# Patient Record
(demographics unavailable — no encounter records)

---

## 2024-10-09 NOTE — DISCUSSION/SUMMARY
[de-identified] : 43yM pw persistent medial knee pain after MCL injury 6/27/24.  Has done extensive PT and good strength/ROM but given medial pain I would recommend repeat MRI to eval for both MCL healing and to eval for possible meniscal injury. The patient was extensively counseled on treatment options including but not limited to observation, rest/activity modification, bracing, anti-inflammatory medications, physical therapy, injections, and surgery.  The natural history of the disease was thoroughly explained.   To better diagnose their condition, I recommended MRI. The patient will proceed with: -MRI -brace -nsaid -pt was instructed on the importance of resting, icing and elevating to minimize swelling -RTC after MRI   I have personally obtained the history, reviewed the ROS as noted, and performed the physical examination today.  The patient and I discussed the assessment and options and developed the plan.  All questions were answered and the patient stated their understanding of the treatment plan and appreciation of the visit.   My cumulative time spent on this patient's visit included: Preparation for the visit, review of the medical records, review of pertinent diagnostic studies, examination and counseling of the patient on the above diagnosis, treatment plan and prognosis, orders of diagnostic tests, medications and/or appropriate procedures and documentation in the medical records of today's visit.   Jitendra Stuart MD

## 2024-10-09 NOTE — HISTORY OF PRESENT ILLNESS
[de-identified] : 43yM pw L knee pain. [FreeTextEntry1] : His knee was injured stepping down from an installation 6/27/24.  He felt a twisting injury to his knee and an MRI demonstrated an MCL sprain, an effusion, and PF chondral wear.  He has been doing PT at [facility] since then but feels sharp medial pain still when he plants or turns.  He has no n/p.  He has been using a brace. Works as .  Working at [employer] on [facility].

## 2024-10-09 NOTE — PHYSICAL EXAM
[de-identified] : Gen: NAD Resp: Nonlabored respirations, no SOB Gait: Nl   L Knee: Skin intact No effusion 0-130 AROM Tender MJL 5/5 IP Q EHL TA GS SILT L3-S1 2+ dp pt wwp bcr   1A lachman and (-) pivot shift Grade 1 posterior drawer Stable to v/v at 0 and 30 degrees (-) Dial test at 30, 90 degrees   (-) Corine, Thessaly Medial joint pain with shallow 2 leg squat   1+ patellar translation (-) pain with patellar compression/grind (-) J sign (-) apprehension  [de-identified] : I independently reviewed and interpreted the MRI of the knee.  I discussed with the patient the MRI demonstrates an MCL sprain, effusion, PF wear

## 2024-11-07 NOTE — DISCUSSION/SUMMARY
[de-identified] : 43yM pw persistent medial knee pain after MCL injury 6/27/24.  Has done extensive PT and good strength/ROM - medial pain now resolved. MRI does not show discrete meniscal tear. The patient was extensively counseled on treatment options including but not limited to observation, rest/activity modification, bracing, anti-inflammatory medications, physical therapy, injections, and surgery.  The natural history of the disease was thoroughly explained.   To better diagnose their condition, I recommended MRI. The patient will proceed with: -brace -nsaid -pt was instructed on the importance of resting, icing and elevating to minimize swelling -RTC 6w   I have personally obtained the history, reviewed the ROS as noted, and performed the physical examination today.  The patient and I discussed the assessment and options and developed the plan.  All questions were answered and the patient stated their understanding of the treatment plan and appreciation of the visit.   My cumulative time spent on this patient's visit included: Preparation for the visit, review of the medical records, review of pertinent diagnostic studies, examination and counseling of the patient on the above diagnosis, treatment plan and prognosis, orders of diagnostic tests, medications and/or appropriate procedures and documentation in the medical records of today's visit.   Jitendra Stuart MD

## 2024-11-07 NOTE — PHYSICAL EXAM
[de-identified] : Gen: NAD Resp: Nonlabored respirations, no SOB Gait: Nl   L Knee: Skin intact No effusion 0-130 AROM nontender 5/5 IP Q EHL TA GS SILT L3-S1 2+ dp pt wwp bcr   1A lachman and (-) pivot shift Grade 1 posterior drawer Stable to v/v at 0 and 30 degrees (-) Dial test at 30, 90 degrees   (-) Corine, Thessaly no joint pain with 2 leg squat   1+ patellar translation (-) pain with patellar compression/grind (-) J sign (-) apprehension  [de-identified] : I independently reviewed and interpreted the MRI of the knee.  I discussed with the patient the MRI demonstrates an MCL sprain, effusion, PF wear

## 2024-11-07 NOTE — RETURN TO WORK/SCHOOL
[FreeTextEntry1] : He was re-evaluated and may return to work on 11/14/24. [FreeTextEntry2] : Jitendra Stuart MD

## 2024-11-07 NOTE — HISTORY OF PRESENT ILLNESS
[de-identified] : 43yM pw L knee pain. [FreeTextEntry1] : His knee was injured stepping down from an installation 6/27/24.  He felt a twisting injury to his knee and an MRI demonstrated an MCL sprain, an effusion, and PF chondral wear.  He has been doing PT at [facility] since then but feels sharp medial pain still when he plants or turns.  He has no n/p.  He has been using a brace. Works as .  Working at [employer] on [facility].  11/6 - has continued PT, MRI complete, feels much stronger

## 2024-11-07 NOTE — PHYSICAL EXAM
[de-identified] : Gen: NAD Resp: Nonlabored respirations, no SOB Gait: Nl   L Knee: Skin intact No effusion 0-130 AROM nontender 5/5 IP Q EHL TA GS SILT L3-S1 2+ dp pt wwp bcr   1A lachman and (-) pivot shift Grade 1 posterior drawer Stable to v/v at 0 and 30 degrees (-) Dial test at 30, 90 degrees   (-) Corine, Thessaly no joint pain with 2 leg squat   1+ patellar translation (-) pain with patellar compression/grind (-) J sign (-) apprehension  [de-identified] : I independently reviewed and interpreted the MRI of the knee.  I discussed with the patient the MRI demonstrates an MCL sprain, effusion, PF wear

## 2024-11-07 NOTE — DISCUSSION/SUMMARY
[de-identified] : 43yM pw persistent medial knee pain after MCL injury 6/27/24.  Has done extensive PT and good strength/ROM - medial pain now resolved. MRI does not show discrete meniscal tear. The patient was extensively counseled on treatment options including but not limited to observation, rest/activity modification, bracing, anti-inflammatory medications, physical therapy, injections, and surgery.  The natural history of the disease was thoroughly explained.   To better diagnose their condition, I recommended MRI. The patient will proceed with: -brace -nsaid -pt was instructed on the importance of resting, icing and elevating to minimize swelling -RTC 6w   I have personally obtained the history, reviewed the ROS as noted, and performed the physical examination today.  The patient and I discussed the assessment and options and developed the plan.  All questions were answered and the patient stated their understanding of the treatment plan and appreciation of the visit.   My cumulative time spent on this patient's visit included: Preparation for the visit, review of the medical records, review of pertinent diagnostic studies, examination and counseling of the patient on the above diagnosis, treatment plan and prognosis, orders of diagnostic tests, medications and/or appropriate procedures and documentation in the medical records of today's visit.   Jitendra Stuart MD

## 2024-11-07 NOTE — HISTORY OF PRESENT ILLNESS
[de-identified] : 43yM pw L knee pain. [FreeTextEntry1] : His knee was injured stepping down from an installation 6/27/24.  He felt a twisting injury to his knee and an MRI demonstrated an MCL sprain, an effusion, and PF chondral wear.  He has been doing PT at [facility] since then but feels sharp medial pain still when he plants or turns.  He has no n/p.  He has been using a brace. Works as .  Working at [employer] on [facility].  11/6 - has continued PT, MRI complete, feels much stronger

## 2024-11-13 NOTE — PROCEDURE
[FreeTextEntry1] : MRI of (L) knee  7/22/2024 revealed a grade 1 partial tear of the MCL, large knee joint effusion, high-grade chondral defect in the trochlea 1.5x1.2cm.  MRI of (L) knee 10/23/2024 revealed interval healing of a grade 1 MCL sprain, moderate to large (L) knee joint effusion, unchanged. Partial-thickness cartilage loss in the patellofemoral compartment worst along lateral trochlear facet, unchanged.

## 2024-11-13 NOTE — PLAN
[FreeTextEntry1] : Cleared to return to work at full duty- note given f/u with ortho as needed, cortisone injection considered  Continue wearing knee brace as needed Will f/u with office if further PT is needed- [facility] @ [phone] Continue with ibuprofen as needed for swelling/pain  [FreeTextEntry2] : 11/14/2024 [FreeTextEntry3] : Good  [FreeTextEntry4] : 3 months

## 2024-11-13 NOTE — HISTORY OF PRESENT ILLNESS
[FreeTextEntry1] : 43 year old male presents to office s/p (L) leg injury while at work. He reports stepping off a ladder carrying a 10ft long pipe with assistance when he felt his left knee buckle as he stepped onto the ground. He immediately felt a sharp aching pain to his left knee. After the incident, he was able to commute home by himself. He reports pain when walking and knee stiffness. He has been applying cold compress to his knee since. He reports his left knee was visibly swollen at time of injury. He states needing to keep knee straight, as bending causes pain. He reports wearing a knee brace which helps alleviate swelling and pain, although once he removes brace, swelling and pain return. MRI of (L) knee revealed a grade 1 partial tear of the MCL, large knee joint effusion, high-grade chondral defect in the trochlea 1.5x1.2cm. He is currently not working. He has been going to PT and reports improvement, would like to continue. He is using a knee brace and applying cold compress as needed. He states he feels "clicking" with knee flexion/extension and occasionally feels weakness.  UPDATE 11/13/2024: Pt reports improvement in symptoms. He reports sharp aching pain, knee stiffness and swelling has improved. He has followed up with Dr. Stuart. Repeat MRI of (L) knee revealed no significant changes from previous MRI. He still c/o "clicking" sound with knee flexion/extension. He was given a knee sleeve brace to replace his prior knee brace (included side stabilizer) which he uses as needed. He has ~2 sessions of PT remaining and will follow up with office if further PT sessions are needed. He is ready to return to work.   [FreeTextEntry2] : [employer]  [FreeTextEntry3] : Pain is exacerbated when walking, bending, squatting, kneeling  [FreeTextEntry4] : None [FreeTextEntry5] : No [Has the patient missed work because of the injury/illness?] : The patient has not missed work because of the injury/illness. [No] : The patient is currently not working. [FreeTextEntry6] : 8/12/2024

## 2024-11-13 NOTE — PHYSICAL EXAM
[General Appearance - Alert] : alert [General Appearance - In No Acute Distress] : in no acute distress [Motor Tone] : muscle strength and tone were normal [FreeTextEntry1] : Crepitus palpable with knee flexion and extension. No tenderness to palpation along patella, MCL and ACL. Full ROM. Negative noble test.  [Skin Color & Pigmentation] : normal skin color and pigmentation [Motor Exam] : the motor exam was normal [Oriented To Time, Place, And Person] : oriented to person, place, and time [Impaired Insight] : insight and judgment were intact

## 2024-11-13 NOTE — ASSESSMENT
[Indicate if, in your opinion, the incident that the patient described was the competent medical cause of this injury/illness.] : The incident that the patient described was the competent medical cause of this injury/illness: Yes [Indicate if the patient's complaints are consistent with his/her history of the injury/illness.] : Indicate if the patient's complaints are consistent with his/her history of the injury/illness: Yes [Yes] : Yes, it is consistent [Can the patient return to usual work activities as indicated? If yes, indicate date___] : The patient can return to usual work activities on [unfilled] [Are there any work limitations? (If so, explain and quantify, including the anticipated duration of the limitations)] : There are no work limitations.  [FreeTextEntry1] : Sprain and strain of MCL (L) knee grade 1 partial tear of MCL [FreeTextEntry5] : 0

## 2025-01-26 NOTE — ASSESSMENT
[FreeTextEntry1] : The patient had a febrile illness with fatigue, sweats and aches and he was in contact with influenza.  The patient likely had influenza.  He is eight days into symptoms so Tamiflu isn't an option.  He now has a worse cough with colored phlegm and a secondary bronchitis is possible.  Will treat with a Z-gibran.  OTC antitussives discussed.  Call PRN.  There was a small amount of blood in the phlegm.  He was advised to monitor and if it persists, he will need a CXR and pulmonary evaluation.

## 2025-01-26 NOTE — HISTORY OF PRESENT ILLNESS
[de-identified] : The patient has symptoms for eight days- he felt sick and he left work. He had fatigue, sweats, fever Tm 101. He then felt a little better but he has a persistent cough. He had an episode of a small amount of blood in the phlegm.  The phlegm was yellow-green.  There is questionable mild dyspnea.  No chest pain, sinus pain, ear pain, sore throat.  He used Dayquil.  He was around coworkers who had influenza.

## 2025-05-04 NOTE — ASSESSMENT
[Vaccines Reviewed] : Immunizations reviewed today. Please see immunization details in the vaccine log within the immunization flowsheet.  [FreeTextEntry1] : Discussed diet and exercise.  Check lipids.  He saw a cardiologist previously and had a calcium scoring CT. Consider another next year.  He has knee pain and there may be an effusion.  he was advised to return to his orthopedist.    he uses Adderall PRN which is helpful.    Check a testosterone.  Colonoscopy advised at age 45.

## 2025-05-04 NOTE — HISTORY OF PRESENT ILLNESS
[FreeTextEntry1] : The patient is here for a routine visit.  [de-identified] : The previous URI resolved.  He hasn't seen any blood.    He had a knee injury at work several months ago and he saw an orthopedist. There was improvement but it is now bothering him again.   Exercise is limited because of his knee.  His diet is healthy.

## 2025-05-04 NOTE — HEALTH RISK ASSESSMENT
[No falls in past year] : Patient reported no falls in the past year [0] : 2) Feeling down, depressed, or hopeless: Not at all (0) [Fully functional (bathing, dressing, toileting, transferring, walking, feeding)] : Fully functional (bathing, dressing, toileting, transferring, walking, feeding) [Fully functional (using the telephone, shopping, preparing meals, housekeeping, doing laundry, using] : Fully functional and needs no help or supervision to perform IADLs (using the telephone, shopping, preparing meals, housekeeping, doing laundry, using transportation, managing medications and managing finances) [[id number]] : 0 [Reports changes in hearing] : Reports no changes in hearing [Reports changes in vision] : Reports no changes in vision [Reports changes in dental health] : Reports no changes in dental health

## 2025-05-04 NOTE — HISTORY OF PRESENT ILLNESS
[FreeTextEntry1] : The patient is here for a routine visit.  [de-identified] : The previous URI resolved.  He hasn't seen any blood.    He had a knee injury at work several months ago and he saw an orthopedist. There was improvement but it is now bothering him again.   Exercise is limited because of his knee.  His diet is healthy.

## 2025-05-04 NOTE — HEALTH RISK ASSESSMENT
[No falls in past year] : Patient reported no falls in the past year [0] : 2) Feeling down, depressed, or hopeless: Not at all (0) [Fully functional (bathing, dressing, toileting, transferring, walking, feeding)] : Fully functional (bathing, dressing, toileting, transferring, walking, feeding) [Fully functional (using the telephone, shopping, preparing meals, housekeeping, doing laundry, using] : Fully functional and needs no help or supervision to perform IADLs (using the telephone, shopping, preparing meals, housekeeping, doing laundry, using transportation, managing medications and managing finances) [[id number]] : 0 [Reports changes in hearing] : Reports no changes in hearing [Reports changes in vision] : Reports no changes in vision [Reports changes in dental health] : Reports no changes in dental health

## 2025-05-04 NOTE — PHYSICAL EXAM
[No Acute Distress] : no acute distress [Well Nourished] : well nourished [Well Developed] : well developed [Well-Appearing] : well-appearing [Normal Sclera/Conjunctiva] : normal sclera/conjunctiva [PERRL] : pupils equal round and reactive to light [EOMI] : extraocular movements intact [Normal Outer Ear/Nose] : the outer ears and nose were normal in appearance none [Normal Oropharynx] : the oropharynx was normal [No JVD] : no jugular venous distention [No Lymphadenopathy] : no lymphadenopathy [Supple] : supple [Thyroid Normal, No Nodules] : the thyroid was normal and there were no nodules present [No Respiratory Distress] : no respiratory distress  [No Accessory Muscle Use] : no accessory muscle use [Clear to Auscultation] : lungs were clear to auscultation bilaterally [Normal Rate] : normal rate  [Regular Rhythm] : with a regular rhythm [Normal S1, S2] : normal S1 and S2 [No Murmur] : no murmur heard [No Carotid Bruits] : no carotid bruits [No Abdominal Bruit] : a ~M bruit was not heard ~T in the abdomen [No Varicosities] : no varicosities [Pedal Pulses Present] : the pedal pulses are present [No Edema] : there was no peripheral edema [No Palpable Aorta] : no palpable aorta [No Extremity Clubbing/Cyanosis] : no extremity clubbing/cyanosis [Soft] : abdomen soft [Non Tender] : non-tender [Non-distended] : non-distended [No Masses] : no abdominal mass palpated [No HSM] : no HSM [Normal Bowel Sounds] : normal bowel sounds [Normal Posterior Cervical Nodes] : no posterior cervical lymphadenopathy [Normal Anterior Cervical Nodes] : no anterior cervical lymphadenopathy [No CVA Tenderness] : no CVA  tenderness [No Spinal Tenderness] : no spinal tenderness [No Joint Swelling] : no joint swelling [Grossly Normal Strength/Tone] : grossly normal strength/tone [No Rash] : no rash [Coordination Grossly Intact] : coordination grossly intact [No Focal Deficits] : no focal deficits [Normal Gait] : normal gait [Deep Tendon Reflexes (DTR)] : deep tendon reflexes were 2+ and symmetric [Normal Affect] : the affect was normal [Normal Insight/Judgement] : insight and judgment were intact